# Patient Record
Sex: MALE | Race: ASIAN | NOT HISPANIC OR LATINO | Employment: FULL TIME | URBAN - METROPOLITAN AREA
[De-identification: names, ages, dates, MRNs, and addresses within clinical notes are randomized per-mention and may not be internally consistent; named-entity substitution may affect disease eponyms.]

---

## 2019-01-23 ENCOUNTER — OFFICE VISIT (OUTPATIENT)
Dept: CARDIOLOGY | Facility: MEDICAL CENTER | Age: 64
End: 2019-01-23
Payer: COMMERCIAL

## 2019-01-23 VITALS
WEIGHT: 185 LBS | DIASTOLIC BLOOD PRESSURE: 80 MMHG | SYSTOLIC BLOOD PRESSURE: 158 MMHG | BODY MASS INDEX: 27.4 KG/M2 | OXYGEN SATURATION: 96 % | HEART RATE: 92 BPM | HEIGHT: 69 IN

## 2019-01-23 DIAGNOSIS — I45.2 RBBB WITH LEFT ANTERIOR FASCICULAR BLOCK: ICD-10-CM

## 2019-01-23 DIAGNOSIS — R73.03 PRE-DIABETES: ICD-10-CM

## 2019-01-23 DIAGNOSIS — I10 ESSENTIAL HYPERTENSION: ICD-10-CM

## 2019-01-23 DIAGNOSIS — Z87.74 S/P REPAIR OF PDA: ICD-10-CM

## 2019-01-23 DIAGNOSIS — R01.1 UNDIAGNOSED CARDIAC MURMURS: ICD-10-CM

## 2019-01-23 PROCEDURE — 99204 OFFICE O/P NEW MOD 45 MIN: CPT | Performed by: INTERNAL MEDICINE

## 2019-01-23 RX ORDER — COVID-19 ANTIGEN TEST
KIT MISCELLANEOUS
COMMUNITY

## 2019-01-23 RX ORDER — VERAPAMIL HYDROCHLORIDE 120 MG/1
120 TABLET, FILM COATED ORAL 3 TIMES DAILY
COMMUNITY
End: 2019-01-23

## 2019-01-23 RX ORDER — VERAPAMIL HYDROCHLORIDE 360 MG/1
360 CAPSULE, DELAYED RELEASE PELLETS ORAL DAILY
Qty: 30 CAP | Refills: 11 | Status: SHIPPED | OUTPATIENT
Start: 2019-01-23 | End: 2019-02-25

## 2019-01-23 ASSESSMENT — ENCOUNTER SYMPTOMS
BLURRED VISION: 0
NAUSEA: 0
CHILLS: 0
PND: 0
DIZZINESS: 0
DEPRESSION: 0
SHORTNESS OF BREATH: 0
FEVER: 0
HEARTBURN: 0
HEADACHES: 0
PALPITATIONS: 0
COUGH: 0
BRUISES/BLEEDS EASILY: 0
EYE DISCHARGE: 0
NERVOUS/ANXIOUS: 0
MYALGIAS: 0

## 2019-01-23 NOTE — LETTER
[facility]   [address], [facility] [address]  [place] [address]  Phone: [phone]  Fax: [phone]              Preston Muro  1955    Encounter Date: 1/23/2019    August Early M.D.          PROGRESS NOTE:  Chief Complaint   Patient presents with   • HTN (Controlled)       Subjective:   Preston Muro is a 63 y.o. male who presents today in consultation at the request of Dr. Mara Jimenez for follow-up of hypertension.    This patient has hypertension and recent addition of verapamil 120 mg twice daily to the existing enalapril 20 mg twice daily has lowered his blood pressure somewhat with still some systolic blood pressures greater than 140.    He reports no side effects from medication.  He has pretty good exercise tolerance and can climb stairs and carry loads without symptoms of shortness of breath.  Every few days he will note only a few seconds of rapid heart action.  Episodes appear to be less than 15 seconds.  No precipitating events or associated symptoms.    We have an EKG from 2014 demonstrating sinus rhythm with left anterior fascicular block.  Most recent EKG available for review reveals the appearance of a right bundle branch block superimposed on the left anterior fascicular block.    He reports he had patent ductus arteriosus surgically repaired at age 19 preceded by illness and fever.  He recovered nicely.  However, ever since then he is unable to sleep flat and usually uses 2 pillows to sleep.  He reports no leg edema  He is on low-dose metformin which apparently has substantially improved his hyperglycemia.  Lab work is not available for review.    Past Medical History:   Diagnosis Date   • Arthritis    • Diabetes (HCC)    • Hypertension      Past Surgical History:   Procedure Laterality Date   • OTHER      age 18, heart surgery     History reviewed. No pertinent family history.  Social History     Social History   • Marital status:       Spouse name: N/A   • Number of children: N/A   • Years of education: N/A     Occupational History   • Not on file.     Social History Main Topics   • Smoking status: Never Smoker   • Smokeless tobacco: Never Used   • Alcohol use Yes      Comment: rarely   • Drug use: No   • Sexual activity: Not on file     Other Topics Concern   • Not on file     Social History Narrative   • No narrative on file     No Known Allergies  Outpatient Encounter Prescriptions as of 1/23/2019   Medication Sig Dispense Refill   • Naproxen Sodium (ALEVE) 220 MG Cap Take  by mouth.     • verapamil (VERELAN) 360 MG CR capsule Take 1 Cap by mouth every day. 30 Cap 11   • enalapril (VASOTEC) 2.5 MG TABS Take 2.5 mg by mouth every day.     • metformin (GLUCOPHAGE) 500 MG TABS Take 500 mg by mouth 2 times a day.     • aspirin (ASA) 81 MG CHEW Take 81 mg by mouth every day.     • [DISCONTINUED] verapamil (ISOPTIN) 120 MG Tab Take 120 mg by mouth 3 times a day.     • [DISCONTINUED] promethazine-codeine (PHENERGAN-CODEINE) 6.25-10 MG/5ML SYRP Take 5 mL by mouth 4 times a day as needed for Cough. (Patient not taking: Reported on 1/23/2019) 120 mL 0   • [DISCONTINUED] ondansetron (ZOFRAN) 8 MG TABS Take 1 Tab by mouth every 8 hours as needed for Nausea/Vomiting. (Patient not taking: Reported on 1/23/2019) 15 Tab 0     No facility-administered encounter medications on file as of 1/23/2019.      Review of Systems   Constitutional: Negative for chills, fever and malaise/fatigue.   Eyes: Negative for blurred vision and discharge.   Respiratory: Negative for cough and shortness of breath.    Cardiovascular: Negative for chest pain, palpitations, leg swelling and PND.   Gastrointestinal: Negative for heartburn and nausea.   Genitourinary: Negative for dysuria and urgency.   Musculoskeletal: Negative for myalgias.   Skin: Negative for itching and rash.   Neurological: Negative for dizziness and headaches.   Endo/Heme/Allergies: Negative for  "environmental allergies. Does not bruise/bleed easily.   Psychiatric/Behavioral: Negative for depression. The patient is not nervous/anxious.         Objective:   /80 (BP Location: Left arm, Patient Position: Sitting, BP Cuff Size: Adult)   Pulse 92   Ht 1.753 m (5' 9\")   Wt 83.9 kg (185 lb)   SpO2 96%   BMI 27.32 kg/m²      Physical Exam   Constitutional: He is oriented to person, place, and time. He appears well-developed and well-nourished.   Neck: No JVD present.   Cardiovascular: Normal rate and regular rhythm.    Murmur heard.   Systolic murmur is present with a grade of 1/6   Pulses:       Carotid pulses are 2+ on the right side, and 2+ on the left side.       Radial pulses are 2+ on the right side, and 2+ on the left side.        Femoral pulses are 2+ on the right side, and 2+ on the left side.       Popliteal pulses are 2+ on the right side, and 2+ on the left side.        Dorsalis pedis pulses are 2+ on the right side, and 2+ on the left side.        Posterior tibial pulses are 2+ on the right side, and 2+ on the left side.   Murmur heard in pulmonic region only   Pulmonary/Chest: Effort normal and breath sounds normal.   Well-healed left thoracotomy scar   Abdominal: Soft. There is no tenderness.   Musculoskeletal: He exhibits no edema or deformity.   Neurological: He is alert and oriented to person, place, and time.   Skin: Skin is warm and dry.       Assessment:     1. Essential hypertension  EC-ECHOCARDIOGRAM COMPLETE W/O CONT    CBC WITH DIFFERENTIAL    COMP METABOLIC PANEL    LIPID PANEL    HEMOGLOBIN A1C WITH MBG ESTIMATE   2. RBBB with left anterior fascicular block  EC-ECHOCARDIOGRAM COMPLETE W/O CONT   3. Undiagnosed cardiac murmurs  EC-ECHOCARDIOGRAM COMPLETE W/O CONT   4. S/P repair of PDA  EC-ECHOCARDIOGRAM COMPLETE W/O CONT   5. Pre-diabetes  HEMOGLOBIN A1C WITH MBG ESTIMATE       Medical Decision Making:  Today's Assessment / Status / Plan:   His hypertension is been helped with " the addition of verapamil.  However some of his blood pressures are still mildly elevated.  Therefore increase verapamil to 360 mg/day.  Lab work ordered in case we need to use low-dose diuretic.  I have ordered an echocardiogram both for his abnormal EKG as well as systolic heart murmur.  Follow-up here in 1 month.  Thank you for this consultation.      Mara Jimenez M.D.  [address] [place] [address] [id number]  [address]  [place] [id number]  VIA Facsimile: [phone]

## 2019-01-24 NOTE — PROGRESS NOTES
Chief Complaint   Patient presents with   • HTN (Controlled)       Subjective:   Preston Muro is a 63 y.o. male who presents today in consultation at the request of Dr. Mara Jimenez for follow-up of hypertension.    This patient has hypertension and recent addition of verapamil 120 mg twice daily to the existing enalapril 20 mg twice daily has lowered his blood pressure somewhat with still some systolic blood pressures greater than 140.    He reports no side effects from medication.  He has pretty good exercise tolerance and can climb stairs and carry loads without symptoms of shortness of breath.  Every few days he will note only a few seconds of rapid heart action.  Episodes appear to be less than 15 seconds.  No precipitating events or associated symptoms.    We have an EKG from 2014 demonstrating sinus rhythm with left anterior fascicular block.  Most recent EKG available for review reveals the appearance of a right bundle branch block superimposed on the left anterior fascicular block.    He reports he had patent ductus arteriosus surgically repaired at age 19 preceded by illness and fever.  He recovered nicely.  However, ever since then he is unable to sleep flat and usually uses 2 pillows to sleep.  He reports no leg edema  He is on low-dose metformin which apparently has substantially improved his hyperglycemia.  Lab work is not available for review.    Past Medical History:   Diagnosis Date   • Arthritis    • Diabetes (HCC)    • Hypertension      Past Surgical History:   Procedure Laterality Date   • OTHER      age 18, heart surgery     History reviewed. No pertinent family history.  Social History     Social History   • Marital status:      Spouse name: N/A   • Number of children: N/A   • Years of education: N/A     Occupational History   • Not on file.     Social History Main Topics   • Smoking status: Never Smoker   • Smokeless tobacco: Never Used   • Alcohol use Yes      Comment: rarely   •  Drug use: No   • Sexual activity: Not on file     Other Topics Concern   • Not on file     Social History Narrative   • No narrative on file     No Known Allergies  Outpatient Encounter Prescriptions as of 1/23/2019   Medication Sig Dispense Refill   • Naproxen Sodium (ALEVE) 220 MG Cap Take  by mouth.     • verapamil (VERELAN) 360 MG CR capsule Take 1 Cap by mouth every day. 30 Cap 11   • enalapril (VASOTEC) 2.5 MG TABS Take 2.5 mg by mouth every day.     • metformin (GLUCOPHAGE) 500 MG TABS Take 500 mg by mouth 2 times a day.     • aspirin (ASA) 81 MG CHEW Take 81 mg by mouth every day.     • [DISCONTINUED] verapamil (ISOPTIN) 120 MG Tab Take 120 mg by mouth 3 times a day.     • [DISCONTINUED] promethazine-codeine (PHENERGAN-CODEINE) 6.25-10 MG/5ML SYRP Take 5 mL by mouth 4 times a day as needed for Cough. (Patient not taking: Reported on 1/23/2019) 120 mL 0   • [DISCONTINUED] ondansetron (ZOFRAN) 8 MG TABS Take 1 Tab by mouth every 8 hours as needed for Nausea/Vomiting. (Patient not taking: Reported on 1/23/2019) 15 Tab 0     No facility-administered encounter medications on file as of 1/23/2019.      Review of Systems   Constitutional: Negative for chills, fever and malaise/fatigue.   Eyes: Negative for blurred vision and discharge.   Respiratory: Negative for cough and shortness of breath.    Cardiovascular: Negative for chest pain, palpitations, leg swelling and PND.   Gastrointestinal: Negative for heartburn and nausea.   Genitourinary: Negative for dysuria and urgency.   Musculoskeletal: Negative for myalgias.   Skin: Negative for itching and rash.   Neurological: Negative for dizziness and headaches.   Endo/Heme/Allergies: Negative for environmental allergies. Does not bruise/bleed easily.   Psychiatric/Behavioral: Negative for depression. The patient is not nervous/anxious.         Objective:   /80 (BP Location: Left arm, Patient Position: Sitting, BP Cuff Size: Adult)   Pulse 92   Ht 1.753 m (5'  "9\")   Wt 83.9 kg (185 lb)   SpO2 96%   BMI 27.32 kg/m²     Physical Exam   Constitutional: He is oriented to person, place, and time. He appears well-developed and well-nourished.   Neck: No JVD present.   Cardiovascular: Normal rate and regular rhythm.    Murmur heard.   Systolic murmur is present with a grade of 1/6   Pulses:       Carotid pulses are 2+ on the right side, and 2+ on the left side.       Radial pulses are 2+ on the right side, and 2+ on the left side.        Femoral pulses are 2+ on the right side, and 2+ on the left side.       Popliteal pulses are 2+ on the right side, and 2+ on the left side.        Dorsalis pedis pulses are 2+ on the right side, and 2+ on the left side.        Posterior tibial pulses are 2+ on the right side, and 2+ on the left side.   Murmur heard in pulmonic region only   Pulmonary/Chest: Effort normal and breath sounds normal.   Well-healed left thoracotomy scar   Abdominal: Soft. There is no tenderness.   Musculoskeletal: He exhibits no edema or deformity.   Neurological: He is alert and oriented to person, place, and time.   Skin: Skin is warm and dry.       Assessment:     1. Essential hypertension  EC-ECHOCARDIOGRAM COMPLETE W/O CONT    CBC WITH DIFFERENTIAL    COMP METABOLIC PANEL    LIPID PANEL    HEMOGLOBIN A1C WITH MBG ESTIMATE   2. RBBB with left anterior fascicular block  EC-ECHOCARDIOGRAM COMPLETE W/O CONT   3. Undiagnosed cardiac murmurs  EC-ECHOCARDIOGRAM COMPLETE W/O CONT   4. S/P repair of PDA  EC-ECHOCARDIOGRAM COMPLETE W/O CONT   5. Pre-diabetes  HEMOGLOBIN A1C WITH MBG ESTIMATE       Medical Decision Making:  Today's Assessment / Status / Plan:   His hypertension is been helped with the addition of verapamil.  However some of his blood pressures are still mildly elevated.  Therefore increase verapamil to 360 mg/day.  Lab work ordered in case we need to use low-dose diuretic.  I have ordered an echocardiogram both for his abnormal EKG as well as systolic " heart murmur.  Follow-up here in 1 month.  Thank you for this consultation.

## 2019-02-07 LAB
CHOLEST SERPL-MCNC: 155 MG/DL (ref 100–199)
EST. AVERAGE GLUCOSE BLD GHB EST-MCNC: 123 MG/DL
HBA1C MFR BLD: 5.9 % (ref 4.8–5.6)
HDLC SERPL-MCNC: 38 MG/DL
LABORATORY COMMENT REPORT: ABNORMAL
LDLC SERPL CALC-MCNC: 83 MG/DL (ref 0–99)
TRIGL SERPL-MCNC: 170 MG/DL (ref 0–149)
VLDLC SERPL CALC-MCNC: 34 MG/DL (ref 5–40)

## 2019-02-14 ENCOUNTER — HOSPITAL ENCOUNTER (OUTPATIENT)
Dept: CARDIOLOGY | Facility: MEDICAL CENTER | Age: 64
End: 2019-02-14
Attending: INTERNAL MEDICINE
Payer: COMMERCIAL

## 2019-02-14 DIAGNOSIS — R01.1 UNDIAGNOSED CARDIAC MURMURS: ICD-10-CM

## 2019-02-14 DIAGNOSIS — I45.2 RBBB WITH LEFT ANTERIOR FASCICULAR BLOCK: ICD-10-CM

## 2019-02-14 DIAGNOSIS — I10 ESSENTIAL HYPERTENSION: ICD-10-CM

## 2019-02-14 DIAGNOSIS — Z87.74 S/P REPAIR OF PDA: ICD-10-CM

## 2019-02-14 LAB
LV EJECT FRACT  99904: 65
LV EJECT FRACT MOD 2C 99903: 67.18
LV EJECT FRACT MOD 4C 99902: 68.22
LV EJECT FRACT MOD BP 99901: 69.77

## 2019-02-14 PROCEDURE — 93306 TTE W/DOPPLER COMPLETE: CPT | Mod: 26 | Performed by: INTERNAL MEDICINE

## 2019-02-14 PROCEDURE — 93306 TTE W/DOPPLER COMPLETE: CPT

## 2019-02-22 ENCOUNTER — TELEPHONE (OUTPATIENT)
Dept: CARDIOLOGY | Facility: MEDICAL CENTER | Age: 64
End: 2019-02-22

## 2019-02-22 NOTE — TELEPHONE ENCOUNTER
----- Message from Tamara York L.P.N. sent at 2/22/2019 10:12 AM PST -----  Regarding: RE: change therapy under ADD  Left message for pt. To call. Pt. Has FV 2-25-19 with [facility].  -----       Message -----  From: Veda Jacob, Med Ass't  Sent: 2/19/2019   4:31 PM  To: Tamara York L.P.N.  Subject: change therapy under ADD                         Patient's pharmacy is not able to get:verapamil   Requesting change in therapy  RX change under ADD?

## 2019-02-25 ENCOUNTER — OFFICE VISIT (OUTPATIENT)
Dept: CARDIOLOGY | Facility: MEDICAL CENTER | Age: 64
End: 2019-02-25
Payer: COMMERCIAL

## 2019-02-25 VITALS
HEART RATE: 68 BPM | BODY MASS INDEX: 26.48 KG/M2 | WEIGHT: 185 LBS | OXYGEN SATURATION: 98 % | HEIGHT: 70 IN | DIASTOLIC BLOOD PRESSURE: 80 MMHG | SYSTOLIC BLOOD PRESSURE: 144 MMHG

## 2019-02-25 DIAGNOSIS — I10 ESSENTIAL HYPERTENSION: ICD-10-CM

## 2019-02-25 DIAGNOSIS — Z87.74 S/P REPAIR OF PDA: ICD-10-CM

## 2019-02-25 DIAGNOSIS — I45.2 RBBB WITH LEFT ANTERIOR FASCICULAR BLOCK: ICD-10-CM

## 2019-02-25 DIAGNOSIS — R01.1 UNDIAGNOSED CARDIAC MURMURS: ICD-10-CM

## 2019-02-25 DIAGNOSIS — R73.03 PRE-DIABETES: ICD-10-CM

## 2019-02-25 PROCEDURE — 99214 OFFICE O/P EST MOD 30 MIN: CPT | Performed by: NURSE PRACTITIONER

## 2019-02-25 RX ORDER — VERAPAMIL HYDROCHLORIDE 360 MG/1
360 CAPSULE, DELAYED RELEASE PELLETS ORAL DAILY
Qty: 90 CAP | Refills: 3 | Status: SHIPPED | OUTPATIENT
Start: 2019-02-25 | End: 2019-02-25 | Stop reason: SDUPTHER

## 2019-02-25 RX ORDER — VERAPAMIL HYDROCHLORIDE 360 MG/1
360 CAPSULE, DELAYED RELEASE PELLETS ORAL DAILY
Qty: 90 CAP | Refills: 3 | Status: SHIPPED | OUTPATIENT
Start: 2019-02-25 | End: 2019-02-26 | Stop reason: SDUPTHER

## 2019-02-25 RX ORDER — ENALAPRIL MALEATE 10 MG/1
20 TABLET ORAL DAILY
COMMUNITY
End: 2019-02-25

## 2019-02-25 RX ORDER — DOCUSATE SODIUM 100 MG/1
100 CAPSULE, LIQUID FILLED ORAL 2 TIMES DAILY
Qty: 90 CAP | Refills: 11 | Status: SHIPPED | OUTPATIENT
Start: 2019-02-25 | End: 2019-02-25 | Stop reason: SDUPTHER

## 2019-02-25 RX ORDER — DOCUSATE SODIUM 100 MG/1
100 CAPSULE, LIQUID FILLED ORAL 2 TIMES DAILY
Qty: 90 CAP | Refills: 3 | Status: SHIPPED | OUTPATIENT
Start: 2019-02-25 | End: 2019-02-26 | Stop reason: SDUPTHER

## 2019-02-25 RX ORDER — ENALAPRIL MALEATE 10 MG/1
20 TABLET ORAL 2 TIMES DAILY
COMMUNITY

## 2019-02-25 ASSESSMENT — ENCOUNTER SYMPTOMS
ORTHOPNEA: 0
DIZZINESS: 0
PND: 0
CONSTIPATION: 1
SHORTNESS OF BREATH: 0
WEAKNESS: 0
PALPITATIONS: 0
WEIGHT LOSS: 0
CLAUDICATION: 0

## 2019-02-25 NOTE — PROGRESS NOTES
Chief Complaint   Patient presents with   • HTN (Controlled)       Subjective:   Preston Muro is a very pleasant 63 y.o. male who presents today for follow up regarding his hypertension.     Patient was seen for initial consultation on 1/23/19 by Dr. August Early, his Verapamil was increased to 360 mg daily and an echocardiogram was ordered due to RBBB with left anterior fascicular block on EKG and auscultation of a systolic heart murmur.    Past medical history significant for patent ductus arteriosus S/P repair when 19 years old, diabetes and arthritis.     Today patient states that he is feeling well.  He has been experiencing some constipation with the verapamil.  BP log from home reviewed showing improvement in blood pressure with readings averaging in the 120s-140/70s-80s.  Patient tells me that his primary care provider Dr. Jimenez has told him that he has renal impairment and needs to see a nephrologist. Denies chest pain, shortness of breath, palpitations or edema.       Past Medical History:   Diagnosis Date   • Arthritis    • Diabetes (HCC)    • Hypertension      Past Surgical History:   Procedure Laterality Date   • OTHER      age 18, heart surgery     History reviewed. No pertinent family history.  Social History     Social History   • Marital status:      Spouse name: N/A   • Number of children: N/A   • Years of education: N/A     Occupational History   • Not on file.     Social History Main Topics   • Smoking status: Never Smoker   • Smokeless tobacco: Never Used   • Alcohol use Yes      Comment: rarely   • Drug use: No   • Sexual activity: Not on file     Other Topics Concern   • Not on file     Social History Narrative   • No narrative on file     No Known Allergies  Outpatient Encounter Prescriptions as of 2/25/2019   Medication Sig Dispense Refill   • docusate sodium (COLACE) 100 MG Cap Take 1 Cap by mouth 2 times a day as needed for Constipation. 60 Cap 2   • enalapril (VASOTEC)  "10 MG Tab Take 20 mg by mouth 2 Times a Day.     • verapamil (VERELAN) 360 MG CR capsule Take 1 Cap by mouth every day. 90 Cap 3   • Naproxen Sodium (ALEVE) 220 MG Cap Take  by mouth.     • metformin (GLUCOPHAGE) 500 MG TABS Take 500 mg by mouth 2 times a day.     • aspirin (ASA) 81 MG CHEW Take 81 mg by mouth every day.     • [DISCONTINUED] enalapril (VASOTEC) 10 MG Tab Take 20 mg by mouth every day.     • [DISCONTINUED] verapamil (VERELAN) 360 MG CR capsule Take 1 Cap by mouth every day. 90 Cap 3   • [DISCONTINUED] docusate sodium (COLACE) 100 MG Cap Take 1 Cap by mouth 2 times a day. 90 Cap 11   • [DISCONTINUED] docusate sodium (COLACE) 100 MG Cap Take 1 Cap by mouth 2 times a day. 90 Cap 3   • [DISCONTINUED] verapamil (VERELAN) 360 MG CR capsule Take 1 Cap by mouth every day. (Patient not taking: Reported on 2/25/2019) 30 Cap 11   • [DISCONTINUED] enalapril (VASOTEC) 2.5 MG TABS Take 2.5 mg by mouth every day.       No facility-administered encounter medications on file as of 2/25/2019.      Review of Systems   Constitutional: Negative for malaise/fatigue and weight loss.   Respiratory: Negative for shortness of breath.    Cardiovascular: Negative for chest pain, palpitations, orthopnea, claudication, leg swelling and PND.   Gastrointestinal: Positive for constipation.   Neurological: Negative for dizziness and weakness.   All other systems reviewed and are negative.       Objective:   /80 (BP Location: Left arm, Patient Position: Sitting, BP Cuff Size: Adult)   Pulse 68   Ht 1.765 m (5' 9.5\")   Wt 83.9 kg (185 lb)   SpO2 98%   BMI 26.93 kg/m²     Physical Exam   Constitutional: He is oriented to person, place, and time. He appears well-developed and well-nourished. No distress.   HENT:   Head: Normocephalic.   Eyes: EOM are normal.   Neck: No JVD present.   Cardiovascular: Normal rate and regular rhythm.    Pulmonary/Chest: Effort normal and breath sounds normal. No respiratory distress.   Abdominal: " Soft. Bowel sounds are normal. There is no tenderness.   Musculoskeletal: He exhibits no edema.   Neurological: He is alert and oriented to person, place, and time.   Skin: Skin is warm and dry.   Psychiatric: He has a normal mood and affect. His behavior is normal. Thought content normal.        TTE 2/14/19:  No prior study is available for comparison.   Normal transthoracic echocardiogram.     Left Ventricle  Normal left ventricular chamber size. Normal left ventricular wall thickness. Normal left ventricular systolic function. Left ventricular ejection fraction is visually estimated to be 65%. Normal regional wall   motion. Normal diastolic function.    2/6/19:  Glycohemoglobin 5.9   4.8 - 5.6 % Final     Cholesterol,Tot 155  100 - 199 mg/dL Final   Triglycerides 170   0 - 149 mg/dL Final   HDL 38   >39 mg/dL Final   VLDL Cholesterol Calc 34  5 - 40 mg/dL Final   LDL 83  0 - 99 mg/dL Final         Assessment:     1. Essential hypertension     2. RBBB with left anterior fascicular block     3. Undiagnosed cardiac murmurs     4. Pre-diabetes     5. S/P repair of PDA         Medical Decision Making:  Today's Assessment / Status / Plan:   HTN:  -Improved.   -BP record from home showing BP averaging 120's-140's/70's-80's.   -Renal function unknown, message left at Dr. Jimenez's office to request copy of labs.   -Continue Verapamil 360 mg daily and enalapril 20 mg BID for know as renal function is not known.  -Docusate prescribed for patients constipation.     RBBB with left anterior fascicular block:  -Nommal TTE on 2/14/19.    Murmur:  -TTE on 2/14/19 showing no significant valvular abnormalities.     Pre-Diabetes:  -A1C on 2/6/19 was 5.9.    Patient will establish care with Dr. Emile Rabago in one month of earlier if needed. Encouraged patient to contact office should any questions or concerns arise in the mean time. Patient understands and agrees with the plan of care.     Future Appointments  Date Time  Provider Department Center   3/27/2019 8:40 AM Emile Rabago M.D. [facility] None     Collaborating Provider: Dr. Ted Santos

## 2019-02-26 DIAGNOSIS — I10 ESSENTIAL HYPERTENSION: Primary | ICD-10-CM

## 2019-02-26 RX ORDER — DOCUSATE SODIUM 100 MG/1
100 CAPSULE, LIQUID FILLED ORAL 2 TIMES DAILY PRN
Qty: 60 CAP | Refills: 2 | Status: SHIPPED | OUTPATIENT
Start: 2019-02-26

## 2019-02-26 RX ORDER — VERAPAMIL HYDROCHLORIDE 360 MG/1
360 CAPSULE, DELAYED RELEASE PELLETS ORAL DAILY
Qty: 90 CAP | Refills: 3 | Status: SHIPPED | OUTPATIENT
Start: 2019-02-26 | End: 2019-03-27 | Stop reason: SINTOL

## 2019-03-27 ENCOUNTER — OFFICE VISIT (OUTPATIENT)
Dept: CARDIOLOGY | Facility: MEDICAL CENTER | Age: 64
End: 2019-03-27
Payer: COMMERCIAL

## 2019-03-27 ENCOUNTER — HOSPITAL ENCOUNTER (OUTPATIENT)
Dept: LAB | Facility: MEDICAL CENTER | Age: 64
End: 2019-03-27
Attending: INTERNAL MEDICINE
Payer: COMMERCIAL

## 2019-03-27 VITALS
HEIGHT: 70 IN | DIASTOLIC BLOOD PRESSURE: 82 MMHG | OXYGEN SATURATION: 97 % | BODY MASS INDEX: 26.71 KG/M2 | SYSTOLIC BLOOD PRESSURE: 134 MMHG | HEART RATE: 64 BPM | WEIGHT: 186.6 LBS

## 2019-03-27 DIAGNOSIS — I10 HYPERTENSION, UNSPECIFIED TYPE: ICD-10-CM

## 2019-03-27 LAB — CREAT SERPL-MCNC: 1.12 MG/DL (ref 0.5–1.4)

## 2019-03-27 PROCEDURE — 36415 COLL VENOUS BLD VENIPUNCTURE: CPT

## 2019-03-27 PROCEDURE — 99214 OFFICE O/P EST MOD 30 MIN: CPT | Performed by: INTERNAL MEDICINE

## 2019-03-27 PROCEDURE — 82565 ASSAY OF CREATININE: CPT

## 2019-03-27 RX ORDER — CARVEDILOL 6.25 MG/1
6.25 TABLET ORAL 2 TIMES DAILY WITH MEALS
Qty: 60 TAB | Refills: 11 | Status: SHIPPED | OUTPATIENT
Start: 2019-03-27

## 2019-03-27 RX ORDER — AMLODIPINE BESYLATE 10 MG/1
10 TABLET ORAL DAILY
Qty: 30 TAB | Refills: 11 | Status: SHIPPED | OUTPATIENT
Start: 2019-03-27

## 2019-03-27 NOTE — PROGRESS NOTES
Cardiology Follow-up Consultation Note    Date of note:    3/27/2019    Primary Care Provider: Mara Jimenez M.D.    Name:             Preston Muro   YOB: 1955  MRN:               [id number]    CC: HTN    Patient ID/HPI:   63-year-old male patient here to establish care for hypertension.  He had a history of PDA closure in [place] when he was 19 years old.  His blood pressure has been well controlled until last year, it has been high recently.  He is blood pressure log shows most of the readings in the 150s-170s.  He also complains there is a small difference between the right and left arm about 10-15 mmHg.  Denies chest pain shortness of breath with activity.  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  He is taking verapamil, however having significant constipation.    ROS    Positive for weight loss, cough, urinary frequency and constipation.  All other systems reviewed and negative    Past Medical History:   Diagnosis Date   • Arthritis    • Diabetes (HCC)    • Hypertension          Past Surgical History:   Procedure Laterality Date   • OTHER      age 18, heart surgery         Current Outpatient Prescriptions   Medication Sig Dispense Refill   • docusate sodium (COLACE) 100 MG Cap Take 1 Cap by mouth 2 times a day as needed for Constipation. 60 Cap 2   • verapamil (VERELAN) 360 MG CR capsule Take 1 Cap by mouth every day. 90 Cap 3   • enalapril (VASOTEC) 10 MG Tab Take 20 mg by mouth 2 Times a Day.     • Naproxen Sodium (ALEVE) 220 MG Cap Take  by mouth.     • metformin (GLUCOPHAGE) 500 MG TABS Take 500 mg by mouth 2 times a day.     • aspirin (ASA) 81 MG CHEW Take 81 mg by mouth every day.       No current facility-administered medications for this visit.          No Known Allergies      No family history of coronary artery disease, father had hypertension    Social History     Social History   • Marital status:      Spouse name: N/A   • Number of children: N/A   • Years of  "education: N/A     Occupational History   • Not on file.     Social History Main Topics   • Smoking status: Never Smoker   • Smokeless tobacco: Never Used   • Alcohol use Yes      Comment: rarely   • Drug use: No   • Sexual activity: Not on file     Other Topics Concern   • Not on file     Social History Narrative   • No narrative on file         Physical Exam:  Ambulatory Vitals  Blood pressure 134/82, pulse 64, height 1.765 m (5' 9.5\"), weight 84.6 kg (186 lb 9.6 oz), SpO2 97 %.   Oxygen Therapy:  Pulse Oximetry: 97 %  BP Readings from Last 4 Encounters:   03/27/19 134/82   02/25/19 144/80   01/23/19 158/80   11/01/14 124/70       Weight/BMI: Body mass index is 27.16 kg/m².  Wt Readings from Last 4 Encounters:   03/27/19 84.6 kg (186 lb 9.6 oz)   02/25/19 83.9 kg (185 lb)   01/23/19 83.9 kg (185 lb)   11/01/14 84.4 kg (186 lb)       General: Well appearing and in no apparent distress  Head: atrumatic  Eyes: No conjunctival pallor   ENT: normal external appearance of nose and ears  Neck: JVD absent, carotid bruits absent  Lungs: respiratory sounds  normal, additional breath sounds absent  Heart: Regular rhythm,   No palpable thrills on palpation, murmurs absent, no rubs,   Lower extremity edema absent.   Pedal pulses normal  Abdomen: soft, non tender, non distended.  Extremities/MSK: no clubbing, no cyanosis  Neurological: normal orientation, Gait normal   Psychiatric: Appropriate affect, intact judgement and insight  Skin: Warm extremities      Lab Data Review:  Lab Results   Component Value Date/Time    CHOLSTRLTOT 155 02/06/2019 10:11 AM    LDL 83 02/06/2019 10:11 AM    HDL 38 (L) 02/06/2019 10:11 AM    TRIGLYCERIDE 170 (H) 02/06/2019 10:11 AM       TTE 2/14/19:  No prior study is available for comparison.   Normal transthoracic echocardiogram.      Left Ventricle  Normal left ventricular chamber size. Normal left ventricular wall thickness. Normal left ventricular systolic function. Left ventricular ejection " fraction is visually estimated to be 65%. Normal regional wall   motion. Normal diastolic function.    Labs 2/6/2019  HB 15.4  Normal CMP  Creatinine 1.32, BUN 17    Impression and Plan:    1.  Hypertension  2.  History of PDA repair, surgical  3.  Prediabetes  4.  CKD stage II-III  5.  Right bundle branch block with left anterior fascicular block  -He has significant side effects from verapamil.  We will stop verapamil, start amlodipine 10 mg daily.  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  Start Coreg 6.25 twice daily along with amlodipine.  Continue enalapril.  -We do not have records regarding his PDA closure, I will order MRA of the chest to evaluate PDA and as well as to rule out Coarctation of Aorta.    Return in 3 months    Emile FOREMAN  Interventional cardiologist  [facility], [address]  [address] [address]  Phone: [phone]  Fax: [phone]

## 2019-03-27 NOTE — LETTER
[facility]   [address]  [place] [address]  Phone: [phone]  Fax: [phone]              Preston Muor  1955    Encounter Date: 3/27/2019    Emile Rabago M.D.          PROGRESS NOTE:        Cardiology Follow-up Consultation Note    Date of note:    3/27/2019    Primary Care Provider: Mraa Jimenez M.D.    Name:             Preston Muro   YOB: 1955  MRN:               [id number]    CC: HTN    Patient ID/HPI:   63-year-old male patient here to establish care for hypertension.  He had a history of PDA closure in [place] when he was 19 years old.  His blood pressure has been well controlled until last year, it has been high recently.  He is blood pressure log shows most of the readings in the 150s-170s.  He also complains there is a small difference between the right and left arm about 10-15 mmHg.  Denies chest pain shortness of breath with activity.  Runs [employer], fairly active.  He is taking verapamil, however having significant constipation.    ROS    Positive for weight loss, cough, urinary frequency and constipation.  All other systems reviewed and negative    Past Medical History:   Diagnosis Date   • Arthritis    • Diabetes (HCC)    • Hypertension          Past Surgical History:   Procedure Laterality Date   • OTHER      age 18, heart surgery         Current Outpatient Prescriptions   Medication Sig Dispense Refill   • docusate sodium (COLACE) 100 MG Cap Take 1 Cap by mouth 2 times a day as needed for Constipation. 60 Cap 2   • verapamil (VERELAN) 360 MG CR capsule Take 1 Cap by mouth every day. 90 Cap 3   • enalapril (VASOTEC) 10 MG Tab Take 20 mg by mouth 2 Times a Day.     • Naproxen Sodium (ALEVE) 220 MG Cap Take  by mouth.     • metformin (GLUCOPHAGE) 500 MG TABS Take 500 mg by mouth 2 times a day.     • aspirin (ASA) 81 MG CHEW Take 81 mg by mouth every day.       No current  "facility-administered medications for this visit.          No Known Allergies      No family history of coronary artery disease, father had hypertension    Social History     Social History   • Marital status:      Spouse name: N/A   • Number of children: N/A   • Years of education: N/A     Occupational History   • Not on file.     Social History Main Topics   • Smoking status: Never Smoker   • Smokeless tobacco: Never Used   • Alcohol use Yes      Comment: rarely   • Drug use: No   • Sexual activity: Not on file     Other Topics Concern   • Not on file     Social History Narrative   • No narrative on file         Physical Exam:  Ambulatory Vitals  Blood pressure 134/82, pulse 64, height 1.765 m (5' 9.5\"), weight 84.6 kg (186 lb 9.6 oz), SpO2 97 %.   Oxygen Therapy:  Pulse Oximetry: 97 %  BP Readings from Last 4 Encounters:   03/27/19 134/82   02/25/19 144/80   01/23/19 158/80   11/01/14 124/70       Weight/BMI: Body mass index is 27.16 kg/m².  Wt Readings from Last 4 Encounters:   03/27/19 84.6 kg (186 lb 9.6 oz)   02/25/19 83.9 kg (185 lb)   01/23/19 83.9 kg (185 lb)   11/01/14 84.4 kg (186 lb)       General: Well appearing and in no apparent distress  Head: atrumatic  Eyes: No conjunctival pallor   ENT: normal external appearance of nose and ears  Neck: JVD absent, carotid bruits absent  Lungs: respiratory sounds  normal, additional breath sounds absent  Heart: Regular rhythm,   No palpable thrills on palpation, murmurs absent, no rubs,   Lower extremity edema absent.   Pedal pulses normal  Abdomen: soft, non tender, non distended.  Extremities/MSK: no clubbing, no cyanosis  Neurological: normal orientation, Gait normal   Psychiatric: Appropriate affect, intact judgement and insight  Skin: Warm extremities      Lab Data Review:  Lab Results   Component Value Date/Time    CHOLSTRLTOT 155 02/06/2019 10:11 AM    LDL 83 02/06/2019 10:11 AM    HDL 38 (L) 02/06/2019 10:11 AM    TRIGLYCERIDE 170 (H) 02/06/2019 " 10:11 AM       TTE 2/14/19:  No prior study is available for comparison.   Normal transthoracic echocardiogram.      Left Ventricle  Normal left ventricular chamber size. Normal left ventricular wall thickness. Normal left ventricular systolic function. Left ventricular ejection fraction is visually estimated to be 65%. Normal regional wall   motion. Normal diastolic function.    Labs 2/6/2019  HB 15.4  Normal CMP  Creatinine 1.32, BUN 17    Impression and Plan:    1.  Hypertension  2.  History of PDA repair, surgical  3.  Prediabetes  4.  CKD stage II-III  5.  Right bundle branch block with left anterior fascicular block  -He has significant side effects from verapamil.  We will stop verapamil, start amlodipine 10 mg daily.  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  Start Coreg 6.25 twice daily along with amlodipine.  Continue enalapril.  -We do not have records regarding his PDA closure, I will order MRA of the chest to evaluate PDA and as well as to rule out Coarctation of Aorta.    Return in 3 months    Emile FOREMAN  Interventional cardiologist  [facility]  [address] [address]  [place] [address]  Phone: [phone]  Fax: [phone]          No Recipients

## 2019-04-10 ENCOUNTER — OFFICE VISIT (OUTPATIENT)
Dept: NEPHROLOGY | Facility: MEDICAL CENTER | Age: 64
End: 2019-04-10
Payer: COMMERCIAL

## 2019-04-10 VITALS
HEIGHT: 70 IN | BODY MASS INDEX: 26.92 KG/M2 | DIASTOLIC BLOOD PRESSURE: 64 MMHG | TEMPERATURE: 97.6 F | HEART RATE: 65 BPM | RESPIRATION RATE: 14 BRPM | OXYGEN SATURATION: 99 % | WEIGHT: 188 LBS | SYSTOLIC BLOOD PRESSURE: 138 MMHG

## 2019-04-10 DIAGNOSIS — E11.8 TYPE 2 DIABETES MELLITUS WITH COMPLICATION, WITHOUT LONG-TERM CURRENT USE OF INSULIN (HCC): ICD-10-CM

## 2019-04-10 DIAGNOSIS — N18.9 CHRONIC KIDNEY DISEASE, UNSPECIFIED CKD STAGE: ICD-10-CM

## 2019-04-10 DIAGNOSIS — N17.9 AKI (ACUTE KIDNEY INJURY) (HCC): ICD-10-CM

## 2019-04-10 DIAGNOSIS — I10 ESSENTIAL HYPERTENSION: ICD-10-CM

## 2019-04-10 DIAGNOSIS — M19.90 OSTEOARTHRITIS, UNSPECIFIED OSTEOARTHRITIS TYPE, UNSPECIFIED SITE: ICD-10-CM

## 2019-04-10 PROCEDURE — 99204 OFFICE O/P NEW MOD 45 MIN: CPT | Performed by: INTERNAL MEDICINE

## 2019-04-10 ASSESSMENT — ENCOUNTER SYMPTOMS
COUGH: 0
FEVER: 0
SHORTNESS OF BREATH: 0
CHILLS: 0
VOMITING: 0
HYPERTENSION: 1
NAUSEA: 0

## 2019-04-10 NOTE — PROGRESS NOTES
"Subjective:      Preston Muro is a 63 y.o. male who presents with Hypertension and Chronic Kidney Disease            Patient is a very pleasant 63-year-old gentleman with a past medical history significant for degenerative joint disease, is on daily use of NSAIDs  Recently had elevated creatinine      Hypertension   This is a chronic problem. The current episode started more than 1 year ago. The problem has been waxing and waning since onset. The problem is uncontrolled. Pertinent negatives include no chest pain, malaise/fatigue, peripheral edema or shortness of breath. Agents associated with hypertension include NSAIDs. Risk factors for coronary artery disease include male gender and diabetes mellitus. Past treatments include calcium channel blockers, ACE inhibitors and beta blockers. The current treatment provides moderate improvement. Compliance problems include diet.  Hypertensive end-organ damage includes kidney disease. Identifiable causes of hypertension include chronic renal disease.   Chronic Kidney Disease   This is a chronic problem. The current episode started more than 1 year ago. The problem occurs intermittently. The problem has been waxing and waning. Pertinent negatives include no chest pain, chills, coughing, fever, nausea, urinary symptoms or vomiting. Exacerbated by: NSAIDs.       Review of Systems   Constitutional: Negative for chills, fever and malaise/fatigue.   Respiratory: Negative for cough and shortness of breath.    Cardiovascular: Negative for chest pain and leg swelling.   Gastrointestinal: Negative for nausea and vomiting.   Genitourinary: Negative for dysuria, frequency and urgency.   Musculoskeletal: Positive for joint pain.   All other systems reviewed and are negative.         Objective:     /64 (BP Location: Right arm, Patient Position: Sitting, BP Cuff Size: Adult)   Pulse 65   Temp 36.4 °C (97.6 °F) (Temporal)   Resp 14   Ht 1.765 m (5' 9.5\")   Wt 85.3 kg (188 " lb)   SpO2 99%   BMI 27.36 kg/m²      Physical Exam   Constitutional: He is oriented to person, place, and time. He appears well-developed and well-nourished. No distress.   HENT:   Head: Normocephalic and atraumatic.   Right Ear: External ear normal.   Left Ear: External ear normal.   Nose: Nose normal.   Mouth/Throat: No oropharyngeal exudate.   Eyes: Pupils are equal, round, and reactive to light. Conjunctivae are normal. Right eye exhibits no discharge. Left eye exhibits no discharge. No scleral icterus.   Neck: No JVD present. No tracheal deviation present. No thyromegaly present.   Cardiovascular: Normal rate and regular rhythm.  Exam reveals no friction rub.    No murmur heard.  Pulmonary/Chest: Effort normal and breath sounds normal. No respiratory distress. He has no wheezes.   Abdominal: Soft. Bowel sounds are normal. He exhibits no distension. There is no tenderness.   Musculoskeletal: Normal range of motion. He exhibits no edema or tenderness.   Neurological: He is alert and oriented to person, place, and time. No cranial nerve deficit.   Skin: Skin is warm and dry. He is not diaphoretic. No erythema.   Psychiatric: He has a normal mood and affect. His behavior is normal. Thought content normal.   Nursing note and vitals reviewed.              Assessment/Plan:     1. Essential hypertension  Uncontrolled  Patient was advised to be on low-salt diet  Check blood pressure at home regularly and call us with the results    2. Chronic kidney disease, unspecified CKD stage  Creatinine is better  No uremic symptoms  Renal dose of medication  Avoid nephrotoxins    3. Osteoarthritis, unspecified osteoarthritis type, unspecified site  Avoid nephrotoxins like NSAIDs    4. Type 2 diabetes mellitus with complication, without long-term current use of insulin (HCC)    5.  Acute kidney injury  Etiology is most likely hemodynamic effect of NSAIDs  Patient was advised to avoid NSAIDs if possible

## 2019-04-13 ENCOUNTER — HOSPITAL ENCOUNTER (OUTPATIENT)
Dept: RADIOLOGY | Facility: MEDICAL CENTER | Age: 64
End: 2019-04-13
Attending: INTERNAL MEDICINE
Payer: COMMERCIAL

## 2019-04-13 DIAGNOSIS — I10 HYPERTENSION, UNSPECIFIED TYPE: ICD-10-CM

## 2019-04-13 PROCEDURE — C8911 MRA W/O FOL W/CONT, CHEST: HCPCS

## 2019-04-13 PROCEDURE — 700117 HCHG RX CONTRAST REV CODE 255: Performed by: INTERNAL MEDICINE

## 2019-04-13 PROCEDURE — A9585 GADOBUTROL INJECTION: HCPCS | Performed by: INTERNAL MEDICINE

## 2019-04-13 RX ORDER — GADOBUTROL 604.72 MG/ML
9 INJECTION INTRAVENOUS ONCE
Status: COMPLETED | OUTPATIENT
Start: 2019-04-13 | End: 2019-04-13

## 2019-04-13 RX ADMIN — GADOBUTROL 9 ML: 604.72 INJECTION INTRAVENOUS at 13:44

## 2019-05-15 ENCOUNTER — OFFICE VISIT (OUTPATIENT)
Dept: URGENT CARE | Facility: CLINIC | Age: 64
End: 2019-05-15
Payer: COMMERCIAL

## 2019-05-15 VITALS
DIASTOLIC BLOOD PRESSURE: 68 MMHG | HEART RATE: 68 BPM | SYSTOLIC BLOOD PRESSURE: 122 MMHG | RESPIRATION RATE: 16 BRPM | BODY MASS INDEX: 26.49 KG/M2 | OXYGEN SATURATION: 98 % | WEIGHT: 182 LBS | TEMPERATURE: 98 F

## 2019-05-15 DIAGNOSIS — S61.512A LACERATION OF LEFT WRIST WITH TENDON INVOLVEMENT, INITIAL ENCOUNTER: ICD-10-CM

## 2019-05-15 DIAGNOSIS — S66.922A LACERATION OF LEFT WRIST WITH TENDON INVOLVEMENT, INITIAL ENCOUNTER: ICD-10-CM

## 2019-05-15 PROCEDURE — 12002 RPR S/N/AX/GEN/TRNK2.6-7.5CM: CPT | Performed by: EMERGENCY MEDICINE

## 2019-05-15 RX ORDER — CEPHALEXIN 500 MG/1
500 CAPSULE ORAL 4 TIMES DAILY
Qty: 20 CAP | Refills: 0 | Status: SHIPPED | OUTPATIENT
Start: 2019-05-15 | End: 2019-05-20

## 2019-05-15 ASSESSMENT — ENCOUNTER SYMPTOMS
TINGLING: 0
SENSORY CHANGE: 0
FOCAL WEAKNESS: 0

## 2019-05-15 NOTE — PROGRESS NOTES
Subjective:      Preston Muro is a 63 y.o. male who presents with Laceration ((L) wrist this morning with glass)             Laceration     Incident onset: today.  The laceration is located on the left arm. The laceration mechanism was a broken glass. The pain is mild. He reports no foreign bodies present. His tetanus status is UTD.   Patient states dropped a drinking glass, broke, single piece of glass cut left wrist region.  Removed glass piece; denies additional injury.    Review of Systems   Musculoskeletal: Negative for joint pain.   Neurological: Negative for tingling, sensory change and focal weakness.     PMH:  has a past medical history of Arthritis; Diabetes (HCC); and Hypertension.  MEDS:   Current Outpatient Prescriptions:   •  amLODIPine (NORVASC) 10 MG Tab, Take 1 Tab by mouth every day., Disp: 30 Tab, Rfl: 11  •  carvedilol (COREG) 6.25 MG Tab, Take 1 Tab by mouth 2 times a day, with meals., Disp: 60 Tab, Rfl: 11  •  docusate sodium (COLACE) 100 MG Cap, Take 1 Cap by mouth 2 times a day as needed for Constipation., Disp: 60 Cap, Rfl: 2  •  enalapril (VASOTEC) 10 MG Tab, Take 20 mg by mouth 2 Times a Day., Disp: , Rfl:   •  Naproxen Sodium (ALEVE) 220 MG Cap, Take  by mouth., Disp: , Rfl:   •  metformin (GLUCOPHAGE) 500 MG TABS, Take 500 mg by mouth 2 times a day., Disp: , Rfl:   •  aspirin (ASA) 81 MG CHEW, Take 81 mg by mouth every day., Disp: , Rfl:   ALLERGIES: No Known Allergies  SURGHX:   Past Surgical History:   Procedure Laterality Date   • OTHER      age 18, heart surgery     SOCHX:  reports that he has never smoked. He has never used smokeless tobacco. He reports that he does not drink alcohol or use drugs.  FH: family history is not on file.       Objective:     /68 (BP Location: Left arm, Patient Position: Sitting, BP Cuff Size: Adult)   Pulse 68   Temp 36.7 °C (98 °F) (Temporal)   Resp 16   Wt 82.6 kg (182 lb)   SpO2 98%   BMI 26.49 kg/m²       Physical Exam      Constitutional: Vital signs are normal. He appears well-developed and well-nourished. He is cooperative. He does not have a sickly appearance. He does not appear ill. No distress.   Cardiovascular:   Pulses:       Radial pulses are 2+ on the right side.   Distal capillary refill intact.   Musculoskeletal:        Left wrist: He exhibits normal range of motion, no bony tenderness, no swelling and no deformity.   No flexor tendon function deficit.   Neurological: He is alert.   Distal sensation to light touch and pressure intact. Distal motor function intact.   Skin: Skin is warm and dry. Laceration noted.        Psychiatric: He has a normal mood and affect.          Procedure: Wound repair  Clean/sterile technique.  The wound site was prepped with Hibiclens antiseptic solution.  Local anesthesia with 2% lidocaine  Normal saline solution irrigation, 100 ml/cm.  The wound was explored, no foreign body seen or palpated.  Hemostasis accomplished; noted approximately 25% partial thickness transection of flexor carpi ulnaris tendon..  Closure with #5-0 nylon, simple sutures, 7 total.  Patient tolerated procedure well.     Assessment/Plan:     1. Laceration of left wrist with tendon involvement, initial encounter  Cleansed, repaired, dressed, splinted.  Rx Keflex  REF HAND

## 2019-05-15 NOTE — PATIENT INSTRUCTIONS
Leave the initial dressing in place, clean and dry, for the next 12-24 hours.  Then, clean the area daily with mild soap and rinse well with water, pat dry with disposable paper towel.  Cover with clean dressing, such as Telfa pad, and hold in place with tape or rolled gauze.  Re-apply splint.  Referral provided.  Use over-the-counter acetaminophen (Tylenol) as needed for pain relief; follow the package instructions for dosing.  Use an oral probiotic daily, such as Culturelle, Align, or yogurt to reduce gastrointestinal symptoms from antibiotic use.  Recheck with physician in 2-3 days; sooner if any increasing pain, worsening redness, swelling or discharge at the site.  Plan return for suture removal in 8 to 10 days.    Laceration Care, Adult  A laceration is a cut that goes through all of the layers of the skin and into the tissue that is right under the skin. Some lacerations heal on their own. Others need to be closed with stitches (sutures), staples, skin adhesive strips, or skin glue. Proper laceration care minimizes the risk of infection and helps the laceration to heal better.  HOW TO CARE FOR YOUR LACERATION  If sutures or staples were used:  · Keep the wound clean and dry.  · If you were given a bandage (dressing), you should change it at least one time per day or as told by your health care provider. You should also change it if it becomes wet or dirty.  · Keep the wound completely dry for the first 24 hours or as told by your health care provider. After that time, you may shower or bathe. However, make sure that the wound is not soaked in water until after the sutures or staples have been removed.  · Clean the wound one time each day or as told by your health care provider:  ¨ Wash the wound with soap and water.  ¨ Rinse the wound with water to remove all soap.  ¨ Pat the wound dry with a clean towel. Do not rub the wound.  · After cleaning the wound, apply a thin layer of antibiotic ointment as told by  your health care provider. This will help to prevent infection and keep the dressing from sticking to the wound.  · Have the sutures or staples removed as told by your health care provider.  If skin adhesive strips were used:  · Keep the wound clean and dry.  · If you were given a bandage (dressing), you should change it at least one time per day or as told by your health care provider. You should also change it if it becomes dirty or wet.  · Do not get the skin adhesive strips wet. You may shower or bathe, but be careful to keep the wound dry.  · If the wound gets wet, pat it dry with a clean towel. Do not rub the wound.  · Skin adhesive strips fall off on their own. You may trim the strips as the wound heals. Do not remove skin adhesive strips that are still stuck to the wound. They will fall off in time.  If skin glue was used:  · Try to keep the wound dry, but you may briefly wet it in the shower or bath. Do not soak the wound in water, such as by swimming.  · After you have showered or bathed, gently pat the wound dry with a clean towel. Do not rub the wound.  · Do not do any activities that will make you sweat heavily until the skin glue has fallen off on its own.  · Do not apply liquid, cream, or ointment medicine to the wound while the skin glue is in place. Using those may loosen the film before the wound has healed.  · If you were given a bandage (dressing), you should change it at least one time per day or as told by your health care provider. You should also change it if it becomes dirty or wet.  · If a dressing is placed over the wound, be careful not to apply tape directly over the skin glue. Doing that may cause the glue to be pulled off before the wound has healed.  · Do not pick at the glue. The skin glue usually remains in place for 5-10 days, then it falls off of the skin.  General Instructions  · Take over-the-counter and prescription medicines only as told by your health care provider.  · If you  were prescribed an antibiotic medicine or ointment, take or apply it as told by your doctor. Do not stop using it even if your condition improves.  · To help prevent scarring, make sure to cover your wound with sunscreen whenever you are outside after stitches are removed, after adhesive strips are removed, or when glue remains in place and the wound is healed. Make sure to wear a sunscreen of at least 30 SPF.  · Do not scratch or pick at the wound.  · Keep all follow-up visits as told by your health care provider. This is important.  · Check your wound every day for signs of infection. Watch for:  ¨ Redness, swelling, or pain.  ¨ Fluid, blood, or pus.  · Raise (elevate) the injured area above the level of your heart while you are sitting or lying down, if possible.  SEEK MEDICAL CARE IF:  · You received a tetanus shot and you have swelling, severe pain, redness, or bleeding at the injection site.  · You have a fever.  · A wound that was closed breaks open.  · You notice a bad smell coming from your wound or your dressing.  · You notice something coming out of the wound, such as wood or glass.  · Your pain is not controlled with medicine.  · You have increased redness, swelling, or pain at the site of your wound.  · You have fluid, blood, or pus coming from your wound.  · You notice a change in the color of your skin near your wound.  · You need to change the dressing frequently due to fluid, blood, or pus draining from the wound.  · You develop a new rash.  · You develop numbness around the wound.  SEEK IMMEDIATE MEDICAL CARE IF:  · You develop severe swelling around the wound.  · Your pain suddenly increases and is severe.  · You develop painful lumps near the wound or on skin that is anywhere on your body.  · You have a red streak going away from your wound.  · The wound is on your hand or foot and you cannot properly move a finger or toe.  · The wound is on your hand or foot and you notice that your fingers or  toes look pale or bluish.     This information is not intended to replace advice given to you by your health care provider. Make sure you discuss any questions you have with your health care provider.     Document Released: 12/18/2006 Document Revised: 05/03/2016 Document Reviewed: 12/14/2015  [employer] Interactive Patient Education ©2016 [employer]

## 2019-07-01 ENCOUNTER — OFFICE VISIT (OUTPATIENT)
Dept: CARDIOLOGY | Facility: MEDICAL CENTER | Age: 64
End: 2019-07-01
Payer: COMMERCIAL

## 2019-07-01 VITALS
HEART RATE: 62 BPM | BODY MASS INDEX: 26.04 KG/M2 | SYSTOLIC BLOOD PRESSURE: 122 MMHG | HEIGHT: 70 IN | WEIGHT: 181.88 LBS | DIASTOLIC BLOOD PRESSURE: 64 MMHG | OXYGEN SATURATION: 97 %

## 2019-07-01 DIAGNOSIS — Z87.74 S/P REPAIR OF PDA: ICD-10-CM

## 2019-07-01 DIAGNOSIS — I10 ESSENTIAL HYPERTENSION: ICD-10-CM

## 2019-07-01 PROCEDURE — 99213 OFFICE O/P EST LOW 20 MIN: CPT | Performed by: INTERNAL MEDICINE

## 2019-07-01 RX ORDER — AMLODIPINE BESYLATE 2.5 MG/1
TABLET ORAL
Refills: 5 | COMMUNITY
Start: 2019-06-22

## 2019-07-01 RX ORDER — FLUOXETINE 10 MG/1
10 CAPSULE ORAL
Refills: 5 | COMMUNITY
Start: 2019-06-22

## 2019-07-01 NOTE — LETTER
[facility]   [address]  [place] [address]  Phone: [phone]  Fax: [phone]              Preston Muro  1955    Encounter Date: 7/1/2019    Emile Rabago M.D.          PROGRESS NOTE:        Cardiology Follow-up Consultation Note    Date of note:    7/1/2019    Primary Care Provider: Mara Jimenez M.D.    Name:             Preston Muro   YOB: 1955  MRN:               [id number]    CC: Follow up HTn    Patient ID/HPI:   63-year-old male patient was initially seen for uncontrolled hypertension.  He was started on amlodipine and carvedilol.  Amlodipine 10 mg resulted in significant pedal edema.  He saw his primary care doctor, who reduced amlodipine dose to 2.5 mg twice a day, patient is taking 2.5 mg once a day along with carvedilol.  Still his blood pressure is well controlled, upper number in 120-128 mostly with lower numbers in 70s.  Denies chest pain shortness of breath.  Since last evaluated by me he did have a minor trauma to his hand resulted in small surgery and stitches.  Other than that he is doing well.      ROS    All systems reviewed and negative.    Past Medical History:   Diagnosis Date   • Arthritis    • Diabetes (HCC)    • Hypertension          Past Surgical History:   Procedure Laterality Date   • OTHER      age 18, heart surgery         Current Outpatient Prescriptions   Medication Sig Dispense Refill   • amLODIPine (NORVASC) 2.5 MG Tab TAKE 1 TO 2 TABLETS BY MOUTH ONCE DAILY IN THE EVENING  5   • FLUoxetine (PROZAC) 10 MG Cap Take 10 mg by mouth.  5   • carvedilol (COREG) 6.25 MG Tab Take 1 Tab by mouth 2 times a day, with meals. 60 Tab 11   • enalapril (VASOTEC) 10 MG Tab Take 20 mg by mouth 2 Times a Day.     • Naproxen Sodium (ALEVE) 220 MG Cap Take  by mouth.     • metformin (GLUCOPHAGE) 500 MG TABS Take 500 mg by mouth 2 times a day.     • aspirin (ASA) 81 MG CHEW Take 81 mg by  "mouth every day.     • amLODIPine (NORVASC) 10 MG Tab Take 1 Tab by mouth every day. (Patient not taking: Reported on 7/1/2019) 30 Tab 11   • docusate sodium (COLACE) 100 MG Cap Take 1 Cap by mouth 2 times a day as needed for Constipation. (Patient not taking: Reported on 7/1/2019) 60 Cap 2     No current facility-administered medications for this visit.          No Known Allergies      History reviewed. No pertinent family history.      Social History     Social History   • Marital status:      Spouse name: N/A   • Number of children: N/A   • Years of education: N/A     Occupational History   • Not on file.     Social History Main Topics   • Smoking status: Never Smoker   • Smokeless tobacco: Never Used   • Alcohol use No      Comment: rarely   • Drug use: No   • Sexual activity: Not on file     Other Topics Concern   • Not on file     Social History Narrative   • No narrative on file         Physical Exam:  Ambulatory Vitals  /64 (BP Location: Left arm, Patient Position: Sitting, BP Cuff Size: Adult)   Pulse 62   Ht 1.765 m (5' 9.5\")   Wt 82.5 kg (181 lb 14.1 oz)   SpO2 97%    Oxygen Therapy:  Pulse Oximetry: 97 %  BP Readings from Last 4 Encounters:   07/01/19 122/64   05/15/19 122/68   04/10/19 138/64   03/27/19 134/82       Weight/BMI: Body mass index is 26.47 kg/m².  Wt Readings from Last 4 Encounters:   07/01/19 82.5 kg (181 lb 14.1 oz)   05/15/19 82.6 kg (182 lb)   04/10/19 85.3 kg (188 lb)   03/27/19 84.6 kg (186 lb 9.6 oz)       General: Well appearing and in no apparent distress  Head: atrumatic  Eyes: No conjunctival pallor   ENT: normal external appearance of nose and ears  Neck: JVD absent, carotid bruits absent  Lungs: respiratory sounds  normal, additional breath sounds absent  Heart: Regular rhythm,   No palpable thrills on palpation, murmurs absent, no rubs,   Lower extremity edema absent.   Pedal pulses normal  Abdomen: soft, non tender, non distended.  Extremities/MSK: no " clubbing, no cyanosis  Neurological: normal orientation, Gait normal   Psychiatric: Appropriate affect, intact judgement and insight  Skin: Warm extremities      Lab Data Review:  Lab Results   Component Value Date/Time    CHOLSTRLTOT 155 02/06/2019 10:11 AM    LDL 83 02/06/2019 10:11 AM    HDL 38 (L) 02/06/2019 10:11 AM    TRIGLYCERIDE 170 (H) 02/06/2019 10:11 AM       Lab Results   Component Value Date/Time    CREATININE 1.12 03/27/2019 12:55 PM     TTE 2/14/19:  No prior study is available for comparison.   Normal transthoracic echocardiogram.      Left Ventricle  Normal left ventricular chamber size. Normal left ventricular wall thickness. Normal left ventricular systolic function. Left ventricular ejection fraction is visually estimated to be 65%. Normal regional wall   motion. Normal diastolic function.     Labs 2/6/2019  HB 15.4  Normal CMP  Creatinine 1.32, BUN 17    MRI aorta    Mild ascending aortic ectasia. No aortic aneurysm or dissection.          Impression and Plan:   1.  Hypertension  2.  History of PDA repair, surgical  3.  Prediabetes  4.  CKD stage II-III  5.  Right bundle branch block with left anterior fascicular block    He is doing well clinically.  Blood pressure is very well controlled.  MRI of his aorta was normal, negative for coarctation.  Continue amlodipine 2.5 mg daily, Coreg 6.25 twice daily.    Return in about 1 year (around 7/1/2020).      Emile FOREMAN  Interventional cardiologist  [facility]  [facility] [address]  [address] [address]  Phone: [phone]  Fax: [phone]          No Recipients

## 2019-07-01 NOTE — PROGRESS NOTES
Cardiology Follow-up Consultation Note    Date of note:    7/1/2019    Primary Care Provider: Mara Jimenez M.D.    Name:             Preston Muro   YOB: 1955  MRN:               [id number]    CC: Follow up HTn    Patient ID/HPI:   63-year-old male patient was initially seen for uncontrolled hypertension.  He was started on amlodipine and carvedilol.  Amlodipine 10 mg resulted in significant pedal edema.  He saw his primary care doctor, who reduced amlodipine dose to 2.5 mg twice a day, patient is taking 2.5 mg once a day along with carvedilol.  Still his blood pressure is well controlled, upper number in 120-128 mostly with lower numbers in 70s.  Denies chest pain shortness of breath.  Since last evaluated by me he did have a minor trauma to his hand resulted in small surgery and stitches.  Other than that he is doing well.      ROS    All systems reviewed and negative.    Past Medical History:   Diagnosis Date   • Arthritis    • Diabetes (HCC)    • Hypertension          Past Surgical History:   Procedure Laterality Date   • OTHER      age 18, heart surgery         Current Outpatient Prescriptions   Medication Sig Dispense Refill   • amLODIPine (NORVASC) 2.5 MG Tab TAKE 1 TO 2 TABLETS BY MOUTH ONCE DAILY IN THE EVENING  5   • FLUoxetine (PROZAC) 10 MG Cap Take 10 mg by mouth.  5   • carvedilol (COREG) 6.25 MG Tab Take 1 Tab by mouth 2 times a day, with meals. 60 Tab 11   • enalapril (VASOTEC) 10 MG Tab Take 20 mg by mouth 2 Times a Day.     • Naproxen Sodium (ALEVE) 220 MG Cap Take  by mouth.     • metformin (GLUCOPHAGE) 500 MG TABS Take 500 mg by mouth 2 times a day.     • aspirin (ASA) 81 MG CHEW Take 81 mg by mouth every day.     • amLODIPine (NORVASC) 10 MG Tab Take 1 Tab by mouth every day. (Patient not taking: Reported on 7/1/2019) 30 Tab 11   • docusate sodium (COLACE) 100 MG Cap Take 1 Cap by mouth 2 times a day as needed for Constipation. (Patient not taking: Reported on  "7/1/2019) 60 Cap 2     No current facility-administered medications for this visit.          No Known Allergies      History reviewed. No pertinent family history.      Social History     Social History   • Marital status:      Spouse name: N/A   • Number of children: N/A   • Years of education: N/A     Occupational History   • Not on file.     Social History Main Topics   • Smoking status: Never Smoker   • Smokeless tobacco: Never Used   • Alcohol use No      Comment: rarely   • Drug use: No   • Sexual activity: Not on file     Other Topics Concern   • Not on file     Social History Narrative   • No narrative on file         Physical Exam:  Ambulatory Vitals  /64 (BP Location: Left arm, Patient Position: Sitting, BP Cuff Size: Adult)   Pulse 62   Ht 1.765 m (5' 9.5\")   Wt 82.5 kg (181 lb 14.1 oz)   SpO2 97%    Oxygen Therapy:  Pulse Oximetry: 97 %  BP Readings from Last 4 Encounters:   07/01/19 122/64   05/15/19 122/68   04/10/19 138/64   03/27/19 134/82       Weight/BMI: Body mass index is 26.47 kg/m².  Wt Readings from Last 4 Encounters:   07/01/19 82.5 kg (181 lb 14.1 oz)   05/15/19 82.6 kg (182 lb)   04/10/19 85.3 kg (188 lb)   03/27/19 84.6 kg (186 lb 9.6 oz)       General: Well appearing and in no apparent distress  Head: atrumatic  Eyes: No conjunctival pallor   ENT: normal external appearance of nose and ears  Neck: JVD absent, carotid bruits absent  Lungs: respiratory sounds  normal, additional breath sounds absent  Heart: Regular rhythm,   No palpable thrills on palpation, murmurs absent, no rubs,   Lower extremity edema absent.   Pedal pulses normal  Abdomen: soft, non tender, non distended.  Extremities/MSK: no clubbing, no cyanosis  Neurological: normal orientation, Gait normal   Psychiatric: Appropriate affect, intact judgement and insight  Skin: Warm extremities      Lab Data Review:  Lab Results   Component Value Date/Time    CHOLSTRLTOT 155 02/06/2019 10:11 AM    LDL 83 02/06/2019 " 10:11 AM    HDL 38 (L) 02/06/2019 10:11 AM    TRIGLYCERIDE 170 (H) 02/06/2019 10:11 AM       Lab Results   Component Value Date/Time    CREATININE 1.12 03/27/2019 12:55 PM     TTE 2/14/19:  No prior study is available for comparison.   Normal transthoracic echocardiogram.      Left Ventricle  Normal left ventricular chamber size. Normal left ventricular wall thickness. Normal left ventricular systolic function. Left ventricular ejection fraction is visually estimated to be 65%. Normal regional wall   motion. Normal diastolic function.     Labs 2/6/2019  HB 15.4  Normal CMP  Creatinine 1.32, BUN 17    MRI aorta    Mild ascending aortic ectasia. No aortic aneurysm or dissection.          Impression and Plan:   1.  Hypertension  2.  History of PDA repair, surgical  3.  Prediabetes  4.  CKD stage II-III  5.  Right bundle branch block with left anterior fascicular block    He is doing well clinically.  Blood pressure is very well controlled.  MRI of his aorta was normal, negative for coarctation.  Continue amlodipine 2.5 mg daily, Coreg 6.25 twice daily.    Return in about 1 year (around 7/1/2020).      Emile FOREMAN  Interventional cardiologist  [facility] [address] [facility]  [address] [address]  [place] [address]  Phone: [phone]  Fax: [phone]

## 2021-03-15 DIAGNOSIS — Z23 NEED FOR VACCINATION: ICD-10-CM

## 2022-12-21 ENCOUNTER — APPOINTMENT (OUTPATIENT)
Dept: LAB | Facility: MEDICAL CENTER | Age: 67
End: 2022-12-21
Attending: FAMILY MEDICINE
Payer: COMMERCIAL

## 2022-12-21 LAB
ALBUMIN SERPL BCP-MCNC: 4.4 G/DL (ref 3.2–4.9)
ALBUMIN/GLOB SERPL: 1.4 G/DL
ALP SERPL-CCNC: 49 U/L (ref 30–99)
ALT SERPL-CCNC: 38 U/L (ref 2–50)
ANION GAP SERPL CALC-SCNC: 14 MMOL/L (ref 7–16)
APPEARANCE UR: CLEAR
AST SERPL-CCNC: 31 U/L (ref 12–45)
BILIRUB SERPL-MCNC: 0.7 MG/DL (ref 0.1–1.5)
BILIRUB UR QL STRIP.AUTO: NEGATIVE
BUN SERPL-MCNC: 11 MG/DL (ref 8–22)
CALCIUM ALBUM COR SERPL-MCNC: 9.1 MG/DL (ref 8.5–10.5)
CALCIUM SERPL-MCNC: 9.4 MG/DL (ref 8.5–10.5)
CHLORIDE SERPL-SCNC: 105 MMOL/L (ref 96–112)
CHOLEST SERPL-MCNC: 153 MG/DL (ref 100–199)
CO2 SERPL-SCNC: 22 MMOL/L (ref 20–33)
COLOR UR: YELLOW
CREAT SERPL-MCNC: 1.12 MG/DL (ref 0.5–1.4)
CREAT UR-MCNC: 113.82 MG/DL
ERYTHROCYTE [DISTWIDTH] IN BLOOD BY AUTOMATED COUNT: 42.1 FL (ref 35.9–50)
EST. AVERAGE GLUCOSE BLD GHB EST-MCNC: 134 MG/DL
FASTING STATUS PATIENT QL REPORTED: NORMAL
GFR SERPLBLD CREATININE-BSD FMLA CKD-EPI: 73 ML/MIN/1.73 M 2
GLOBULIN SER CALC-MCNC: 3.2 G/DL (ref 1.9–3.5)
GLUCOSE SERPL-MCNC: 142 MG/DL (ref 65–99)
GLUCOSE UR STRIP.AUTO-MCNC: NEGATIVE MG/DL
HBA1C MFR BLD: 6.3 % (ref 4–5.6)
HCT VFR BLD AUTO: 48.3 % (ref 42–52)
HDLC SERPL-MCNC: 44 MG/DL
HGB BLD-MCNC: 15.7 G/DL (ref 14–18)
KETONES UR STRIP.AUTO-MCNC: NEGATIVE MG/DL
LDLC SERPL CALC-MCNC: 75 MG/DL
LEUKOCYTE ESTERASE UR QL STRIP.AUTO: NEGATIVE
MCH RBC QN AUTO: 29.4 PG (ref 27–33)
MCHC RBC AUTO-ENTMCNC: 32.5 G/DL (ref 33.7–35.3)
MCV RBC AUTO: 90.4 FL (ref 81.4–97.8)
MICRO URNS: NORMAL
MICROALBUMIN UR-MCNC: <1.2 MG/DL
MICROALBUMIN/CREAT UR: NORMAL MG/G (ref 0–30)
NITRITE UR QL STRIP.AUTO: NEGATIVE
PH UR STRIP.AUTO: 5.5 [PH] (ref 5–8)
PLATELET # BLD AUTO: 264 K/UL (ref 164–446)
PMV BLD AUTO: 10.9 FL (ref 9–12.9)
POTASSIUM SERPL-SCNC: 4.5 MMOL/L (ref 3.6–5.5)
PROT SERPL-MCNC: 7.6 G/DL (ref 6–8.2)
PROT UR QL STRIP: NEGATIVE MG/DL
PSA SERPL-MCNC: 0.37 NG/ML (ref 0–4)
RBC # BLD AUTO: 5.34 M/UL (ref 4.7–6.1)
RBC UR QL AUTO: NEGATIVE
SODIUM SERPL-SCNC: 141 MMOL/L (ref 135–145)
SP GR UR STRIP.AUTO: 1.02
TRIGL SERPL-MCNC: 171 MG/DL (ref 0–149)
TSH SERPL DL<=0.005 MIU/L-ACNC: 2.37 UIU/ML (ref 0.38–5.33)
UROBILINOGEN UR STRIP.AUTO-MCNC: 0.2 MG/DL
WBC # BLD AUTO: 6.4 K/UL (ref 4.8–10.8)

## 2022-12-21 PROCEDURE — 84153 ASSAY OF PSA TOTAL: CPT

## 2022-12-21 PROCEDURE — 36415 COLL VENOUS BLD VENIPUNCTURE: CPT

## 2022-12-21 PROCEDURE — 80061 LIPID PANEL: CPT

## 2022-12-21 PROCEDURE — 83036 HEMOGLOBIN GLYCOSYLATED A1C: CPT

## 2022-12-21 PROCEDURE — 81003 URINALYSIS AUTO W/O SCOPE: CPT

## 2022-12-21 PROCEDURE — 85027 COMPLETE CBC AUTOMATED: CPT

## 2022-12-21 PROCEDURE — 80053 COMPREHEN METABOLIC PANEL: CPT

## 2022-12-21 PROCEDURE — 82043 UR ALBUMIN QUANTITATIVE: CPT

## 2022-12-21 PROCEDURE — 82570 ASSAY OF URINE CREATININE: CPT

## 2022-12-21 PROCEDURE — 84443 ASSAY THYROID STIM HORMONE: CPT
